# Patient Record
Sex: MALE | Race: WHITE | URBAN - METROPOLITAN AREA
[De-identification: names, ages, dates, MRNs, and addresses within clinical notes are randomized per-mention and may not be internally consistent; named-entity substitution may affect disease eponyms.]

---

## 2017-02-08 ENCOUNTER — HISTORICAL (OUTPATIENT)
Dept: CARDIOLOGY | Facility: HOSPITAL | Age: 52
End: 2017-02-08

## 2018-01-31 ENCOUNTER — HISTORICAL (OUTPATIENT)
Dept: ADMINISTRATIVE | Facility: HOSPITAL | Age: 53
End: 2018-01-31

## 2018-01-31 LAB
BUN SERPL-MCNC: 24 MG/DL (ref 7–18)
CALCIUM SERPL-MCNC: 7.6 MG/DL (ref 8.5–10.1)
CHLORIDE SERPL-SCNC: 100 MMOL/L (ref 98–107)
CO2 SERPL-SCNC: 28 MMOL/L (ref 21–32)
CREAT SERPL-MCNC: 9.41 MG/DL (ref 0.7–1.3)
GLUCOSE SERPL-MCNC: 87 MG/DL (ref 74–106)
POTASSIUM SERPL-SCNC: 4.5 MMOL/L (ref 3.5–5.1)
SODIUM SERPL-SCNC: 138 MMOL/L (ref 136–145)

## 2019-02-05 ENCOUNTER — OFFICE VISIT (OUTPATIENT)
Dept: UROLOGY | Facility: CLINIC | Age: 54
End: 2019-02-05
Payer: MEDICARE

## 2019-02-05 VITALS — DIASTOLIC BLOOD PRESSURE: 59 MMHG | HEART RATE: 77 BPM | SYSTOLIC BLOOD PRESSURE: 111 MMHG

## 2019-02-05 DIAGNOSIS — I10 ESSENTIAL HYPERTENSION: ICD-10-CM

## 2019-02-05 DIAGNOSIS — R31.0 GROSS HEMATURIA: Primary | ICD-10-CM

## 2019-02-05 DIAGNOSIS — Q61.3 POLYCYSTIC KIDNEY DISEASE: ICD-10-CM

## 2019-02-05 DIAGNOSIS — R31.0 HEMATURIA, GROSS: ICD-10-CM

## 2019-02-05 DIAGNOSIS — N18.5 CKD (CHRONIC KIDNEY DISEASE) STAGE 5, GFR LESS THAN 15 ML/MIN: ICD-10-CM

## 2019-02-05 PROCEDURE — 99999 PR PBB SHADOW E&M-NEW PATIENT-LVL III: CPT | Mod: PBBFAC,,, | Performed by: UROLOGY

## 2019-02-05 PROCEDURE — 88112 CYTOPATH CELL ENHANCE TECH: CPT | Performed by: PATHOLOGY

## 2019-02-05 PROCEDURE — 99999 PR PBB SHADOW E&M-NEW PATIENT-LVL III: ICD-10-PCS | Mod: PBBFAC,,, | Performed by: UROLOGY

## 2019-02-05 PROCEDURE — 87086 URINE CULTURE/COLONY COUNT: CPT

## 2019-02-05 PROCEDURE — 99203 OFFICE O/P NEW LOW 30 MIN: CPT | Mod: PBBFAC | Performed by: UROLOGY

## 2019-02-05 PROCEDURE — 99204 OFFICE O/P NEW MOD 45 MIN: CPT | Mod: S$PBB,,, | Performed by: UROLOGY

## 2019-02-05 PROCEDURE — 88112 CYTOLOGY SPECIMEN-URINE: ICD-10-PCS | Mod: 26,,, | Performed by: PATHOLOGY

## 2019-02-05 PROCEDURE — 99204 PR OFFICE/OUTPT VISIT, NEW, LEVL IV, 45-59 MIN: ICD-10-PCS | Mod: S$PBB,,, | Performed by: UROLOGY

## 2019-02-05 RX ORDER — PRAVASTATIN SODIUM 40 MG/1
TABLET ORAL
COMMUNITY

## 2019-02-05 RX ORDER — FLUTICASONE PROPIONATE AND SALMETEROL 50; 250 UG/1; UG/1
1 POWDER RESPIRATORY (INHALATION) 2 TIMES DAILY
Refills: 6 | COMMUNITY
Start: 2019-01-30

## 2019-02-05 RX ORDER — METOPROLOL TARTRATE 25 MG/1
25 TABLET, FILM COATED ORAL 2 TIMES DAILY
Refills: 6 | COMMUNITY
Start: 2019-01-14 | End: 2019-02-05

## 2019-02-05 RX ORDER — ATORVASTATIN CALCIUM 80 MG/1
TABLET, FILM COATED ORAL
COMMUNITY
Start: 2018-06-20 | End: 2019-02-05 | Stop reason: SDUPTHER

## 2019-02-05 RX ORDER — GABAPENTIN 100 MG/1
CAPSULE ORAL
COMMUNITY
Start: 2017-02-08 | End: 2019-02-05

## 2019-02-05 RX ORDER — ATORVASTATIN CALCIUM 80 MG/1
80 TABLET, FILM COATED ORAL DAILY
Refills: 3 | COMMUNITY
Start: 2019-01-14

## 2019-02-05 RX ORDER — AMOXICILLIN AND CLAVULANATE POTASSIUM 875; 125 MG/1; MG/1
TABLET, FILM COATED ORAL
COMMUNITY
End: 2019-02-05

## 2019-02-05 RX ORDER — TAMSULOSIN HYDROCHLORIDE 0.4 MG/1
CAPSULE ORAL
COMMUNITY
Start: 2016-07-01

## 2019-02-05 RX ORDER — CEPHALEXIN 500 MG/1
CAPSULE ORAL
COMMUNITY
End: 2019-02-05

## 2019-02-05 RX ORDER — SEVELAMER CARBONATE 800 MG/1
TABLET, FILM COATED ORAL
COMMUNITY
Start: 2018-12-27

## 2019-02-05 RX ORDER — TAMSULOSIN HYDROCHLORIDE 0.4 MG/1
1 CAPSULE ORAL DAILY
Refills: 3 | COMMUNITY
Start: 2018-11-29

## 2019-02-05 RX ORDER — TRAMADOL HYDROCHLORIDE 50 MG/1
TABLET ORAL
COMMUNITY

## 2019-02-05 RX ORDER — CALCIUM ACETATE 667 MG/1
CAPSULE ORAL
COMMUNITY
Start: 2016-07-01 | End: 2019-02-05

## 2019-02-05 RX ORDER — DOXYCYCLINE HYCLATE 100 MG
TABLET ORAL
Refills: 0 | COMMUNITY
Start: 2019-01-23 | End: 2019-02-05

## 2019-02-05 RX ORDER — OXYCODONE AND ACETAMINOPHEN 10; 325 MG/1; MG/1
1 TABLET ORAL 3 TIMES DAILY PRN
Refills: 0 | COMMUNITY
Start: 2019-01-14

## 2019-02-05 RX ORDER — OXYCODONE AND ACETAMINOPHEN 10; 325 MG/1; MG/1
TABLET ORAL
COMMUNITY

## 2019-02-05 RX ORDER — AMOXICILLIN 875 MG/1
TABLET, FILM COATED ORAL
COMMUNITY
End: 2019-02-05

## 2019-02-05 RX ORDER — GABAPENTIN 300 MG/1
CAPSULE ORAL
Refills: 3 | COMMUNITY
Start: 2019-01-24

## 2019-02-05 RX ORDER — CLINDAMYCIN HYDROCHLORIDE 150 MG/1
CAPSULE ORAL
COMMUNITY
End: 2019-02-05

## 2019-02-05 RX ORDER — CLOPIDOGREL BISULFATE 75 MG/1
75 TABLET ORAL DAILY
Refills: 3 | COMMUNITY
Start: 2019-01-14

## 2019-02-05 RX ORDER — CALCITRIOL 0.5 UG/1
0.5 CAPSULE ORAL
COMMUNITY
Start: 2018-03-02 | End: 2019-03-02

## 2019-02-05 RX ORDER — CALCIUM CARBONATE 200(500)MG
1000 TABLET,CHEWABLE ORAL
COMMUNITY
Start: 2018-03-01 | End: 2019-02-05

## 2019-02-05 RX ORDER — OMEPRAZOLE 40 MG/1
40 CAPSULE, DELAYED RELEASE ORAL DAILY
Refills: 3 | COMMUNITY
Start: 2019-01-07

## 2019-02-05 RX ORDER — CALCITRIOL 0.5 UG/1
CAPSULE ORAL
COMMUNITY
Start: 2018-06-11 | End: 2019-02-05 | Stop reason: SDUPTHER

## 2019-02-05 RX ORDER — GABAPENTIN 300 MG/1
CAPSULE ORAL
COMMUNITY
Start: 2016-07-01 | End: 2019-02-05

## 2019-02-05 RX ORDER — METOPROLOL TARTRATE 25 MG/1
TABLET, FILM COATED ORAL
COMMUNITY
Start: 2018-06-20 | End: 2019-02-05

## 2019-02-05 RX ORDER — HYDROCODONE BITARTRATE AND ACETAMINOPHEN 10; 325 MG/1; MG/1
TABLET ORAL
COMMUNITY
End: 2019-02-05

## 2019-02-05 RX ORDER — HYDROMORPHONE HYDROCHLORIDE 2 MG/1
TABLET ORAL
COMMUNITY
End: 2019-02-05

## 2019-02-05 RX ORDER — ASPIRIN 81 MG/1
TABLET ORAL
COMMUNITY
Start: 2018-06-20

## 2019-02-05 RX ORDER — CLOTRIMAZOLE AND BETAMETHASONE DIPROPIONATE 10; .64 MG/G; MG/G
CREAM TOPICAL
COMMUNITY
End: 2019-02-05

## 2019-02-05 RX ORDER — AMITRIPTYLINE HYDROCHLORIDE 75 MG/1
TABLET ORAL
COMMUNITY
End: 2019-02-05

## 2019-02-05 RX ORDER — CLOPIDOGREL BISULFATE 75 MG/1
TABLET ORAL
COMMUNITY
Start: 2016-07-01

## 2019-02-05 RX ORDER — FUROSEMIDE 80 MG/1
80 TABLET ORAL DAILY
Refills: 5 | COMMUNITY
Start: 2019-01-14 | End: 2019-02-05

## 2019-02-05 RX ORDER — AMOXICILLIN 250 MG
1 CAPSULE ORAL
COMMUNITY
Start: 2018-03-01 | End: 2019-03-01

## 2019-02-05 NOTE — LETTER
February 5, 2019      Galileo Gamboa MD  [address]  [facility] [address]           [facility]  [address]  [place] [address]  Phone: [phone]          Patient: Omari Dunham Jr.   MR Number: [id number]   YOB: 1965   Date of Visit: 2/5/2019       Dear Dr. Galileo Gamboa:    Thank you for referring Omari Dunham to me for evaluation. Attached you will find relevant portions of my assessment and plan of care.    If you have questions, please do not hesitate to call me. I look forward to following Omari Dunham along with you.    Sincerely,    Ryan Wesley MD    Enclosure  CC:  No Recipients    If you would like to receive this communication electronically, please contact [email or web address] or [phone] to request more information on "[employer]" Link access.    For providers and/or their staff who would like to refer a patient to [facility], please contact us through our one-stop-shop provider referral line, [facility], at [phone].    If you feel you have received this communication in error or would no longer like to receive these types of communications, please e-mail [email or web address]

## 2019-02-05 NOTE — PROGRESS NOTES
Subjective:       Patient ID: Omari Dunham Jr. is a 53 y.o. male.    Chief Complaint:  Renal Cyst/Gross Hematuria  Patient is a 53 y.o. male who is new to our clinic and self-referred for evaluation of gross hematuria.       History of Present Illness  HPI  Patient has PCKD and has recurrent gross hematuria.  Started 3 years ago.  Has ~3 episodes per year---presumably from popped cysts and gross hematuria.  Thinks it is usually the right side but unsure of this.     His last episode of this was 6 weeks ago.  Has been managed as an outpatient.  He typically has associated flank pain with these episodes.     Denies requiring blood transfusions.  Currently without pain.  Patient sees nephrology--Dr. Gamboa.   He is a former smoker---quit 9 months ago.  Smoked 1ppd for 10 years.   He was found to have a pneumonia as well as elevated troponins---dates back to 5/2018.  He underwent CABG.  Is on plavix currently.  He denies any chest pain or shortness of breath.     No results found for: PSA, PSADIAG, PSATOTAL, PSAFREE      Review of Systems  Review of Systems  All other systems reviewed and negative except pertinent positives noted in HPI.       Objective:     Physical Exam   Nursing note and vitals reviewed.  Constitutional: He is oriented to person, place, and time. Vital signs are normal. He appears well-developed and well-nourished. He is cooperative.   HENT:   Head: Normocephalic.   Neck: No tracheal deviation present.   Cardiovascular: Normal rate and intact distal pulses.    Pulmonary/Chest: Effort normal. No accessory muscle usage. No tachypnea. No respiratory distress.   Abdominal: Soft. Normal appearance. He exhibits no distension, no fluid wave, no ascites and no mass. There is no tenderness. There is no rebound and no CVA tenderness. No hernia. Hernia confirmed negative in the right inguinal area and confirmed negative in the left inguinal area.       Liver/spleen non-palpable.   Genitourinary: Prostate  normal, testes normal and penis normal. Rectal exam shows no external hemorrhoid, no mass, no tenderness and anal tone normal. Prostate is not tender. Right testis shows no mass and no tenderness. Right testis is descended. Left testis shows no mass and no tenderness. Left testis is descended. Circumcised.   Genitourinary Comments: Scrotum showed no rashes or lesions.  Anus/perineum without lesion.  Epididymis showed no masses or tenderness. No meatal stenosis, meatus in normal location. No penile discharge/plaques. Prostate smooth, no nodules, 20 grams.  Seminal vesicles non-palpable.  Normal sphincter tone.        Musculoskeletal: Normal range of motion.   Lymphadenopathy: No inguinal adenopathy noted on the right or left side.        Right: No inguinal adenopathy present.        Left: No inguinal adenopathy present.   Neurological: He is alert and oriented to person, place, and time. He has normal strength.   Skin: No bruising and no rash noted.     Psychiatric: He has a normal mood and affect. His speech is normal and behavior is normal. Thought content normal.       Lab Review  No results found for: COLORU, SPECGRAV, PHUR, WBCUR, NITRITE, PROTEINUR, GLUCOSEUR, KETONESU, UROBILINOGEN, BILIRUBINUR, RBCUR      Assessment:        1. Gross hematuria    2. Polycystic kidney disease    3. CKD (chronic kidney disease) stage 5, GFR less than 15 ml/min    4. Hematuria, gross    5. Essential hypertension            Plan:     Gross hematuria  -     CT Urogram Abd Pelvis W WO; Future; Expected date: 02/05/2019  -     Cystoscopy; Future  -     Cytology, urine  -     Urine culture    Polycystic kidney disease    CKD (chronic kidney disease) stage 5, GFR less than 15 ml/min    Hematuria, gross    Essential hypertension    1. Hematuria:  The patient will be set up for a hematuria workup to include a CT urogram, urine cytology, cystoscopy and urine culture.  A BMP will be ordered if not done in the last 60 days.  The risks and  benefits of cystoscopy were discussed with the patient.     2.PCKD:  -will assess on CT urogram  -patient to bring in previous discs with imaging for review    3. CKD:   -continue hemodialysis  -will discuss possible nephrectomy for recurrent chronic gross hematuria after completion of his hematuria workup    4. HTN  -BP reviewed  -stable, continue meds and f/u with PCP

## 2019-02-06 LAB — BACTERIA UR CULT: NORMAL

## 2019-02-07 ENCOUNTER — TELEPHONE (OUTPATIENT)
Dept: UROLOGY | Facility: CLINIC | Age: 54
End: 2019-02-07

## 2019-02-07 NOTE — TELEPHONE ENCOUNTER
Spoke with pt and notified him of negative urine cx. Encouraged to call for any questions or concerns. Voiced understanding    ----- Message from Ryan Wesley MD sent at 2/7/2019  6:47 AM CST -----  Please call patient and notify of negative urine culture. Thank you.

## 2019-02-12 ENCOUNTER — TELEPHONE (OUTPATIENT)
Dept: UROLOGY | Facility: CLINIC | Age: 54
End: 2019-02-12

## 2019-02-28 ENCOUNTER — TELEPHONE (OUTPATIENT)
Dept: UROLOGY | Facility: CLINIC | Age: 54
End: 2019-02-28

## 2022-04-10 ENCOUNTER — HISTORICAL (OUTPATIENT)
Dept: ADMINISTRATIVE | Facility: HOSPITAL | Age: 57
End: 2022-04-10
Payer: MEDICARE

## 2022-04-11 ENCOUNTER — HISTORICAL (OUTPATIENT)
Dept: ADMINISTRATIVE | Facility: HOSPITAL | Age: 57
End: 2022-04-11
Payer: MEDICARE

## 2022-04-28 VITALS — DIASTOLIC BLOOD PRESSURE: 70 MMHG | SYSTOLIC BLOOD PRESSURE: 118 MMHG | OXYGEN SATURATION: 96 %

## 2022-04-28 VITALS — SYSTOLIC BLOOD PRESSURE: 118 MMHG | OXYGEN SATURATION: 96 % | DIASTOLIC BLOOD PRESSURE: 70 MMHG

## 2022-04-30 NOTE — OP NOTE
DATE OF SURGERY:    01/31/2018    SURGEON:  Anne-Marie Venegas MD    PREOPERATIVE DIAGNOSIS:  End stage renal disease.    PROCEDURE:  Left arm basilic transposition with stent graft removal from antecubital vein.    HISTORY OF PRESENTING ILLNESS:  Mr. Omari Dunham is a 52-year-old gentleman with end stage renal disease who has a thrombosed forearm dialysis access.  He underwent thrombectomy and previous stent placement and has had recurrence.  We ultimately decided to proceed with new access creation utilizing his basilic vein, which was the main outflow of his forearm access.  He did have a Viabahn stent graft addressing the stenosis near the antecubital fossa.  A portion of that stent does extend into the beginnings of the basilic vein.    PROCEDURE IN DETAIL:  Mr. Dunham was taken to the operating room and placed in supine position where general endotracheal anesthesia was initiated.  His left arm was prepped and draped in the usual sterile fashion.  Appropriate antibiotics were administered.  Appropriate time out was performed.  B-mode ultrasound was utilized to identify the course of the basilic vein through the left upper arm.  A long incision was made over the course of this vein.  Dissection was performed through the skin and subcutaneous tissues down to the level of the vein.  Multiple side branches were ligated with 2-0 and 3-0 silk ties as well as surgical clips.  In dissection of the distal basilic vein as we neared the antecubital fossa we did encounter the previous stent.  We did dissect the vessel beyond that stent, ligated the vein at that location with 2-0 silk tie.  Ultimately transected the vein around the stent and was able to fully remove the stent from the basilic vein.  We then performed tunneling through the upper arm, brought our basilic vein through this tunnel, transected the most distal portion of the basilic vein that was in contact with the stent. 5,000 units of heparin was  administered.  The brachial artery was clamped and an end-to-side anastomosis was performed with running 6-0 Prolene suture.  Upon completing the anastomosis we allowed forward flow through the system.  There was a good thrill to the fistula. Protamine was administered.  Hemostasis was obtained.  The wound was irrigated with bacitracin irrigation.  Two layers of 3-0 Vicryl suture were utilized to reapproximate the subcutaneous tissues and surgical skin staples were utilized to reapproximate the skin.      ______________________________  MD ROSA MARIA Denis/KIAH  DD:  02/01/2018  Time:  06:43AM  DT:  02/01/2018  Time:  04:26PM  Job #:  [id number]